# Patient Record
Sex: MALE | Race: BLACK OR AFRICAN AMERICAN | NOT HISPANIC OR LATINO | Employment: OTHER | URBAN - METROPOLITAN AREA
[De-identification: names, ages, dates, MRNs, and addresses within clinical notes are randomized per-mention and may not be internally consistent; named-entity substitution may affect disease eponyms.]

---

## 2019-07-24 ENCOUNTER — TELEPHONE (OUTPATIENT)
Dept: PHARMACY | Facility: CLINIC | Age: 54
End: 2019-07-24

## 2019-07-24 PROBLEM — B18.2 CHRONIC HEPATITIS C WITHOUT HEPATIC COMA: Status: ACTIVE | Noted: 2019-07-24

## 2019-07-24 NOTE — TELEPHONE ENCOUNTER
Informed Patient  that [facility] received prescription for Epclusa and benefits investigation is required.  [facility] will be back in touch once insurance determination is received.

## 2019-08-14 ENCOUNTER — TELEPHONE (OUTPATIENT)
Dept: PHARMACY | Facility: CLINIC | Age: 54
End: 2019-08-14

## 2019-08-23 NOTE — TELEPHONE ENCOUNTER
Initial Medication Consultation: AG Epclusa    Initial Epclusa consult completed on . Epclusa will be shipped on  to arrive at patient's home on  via [employer]. $0.00 copay. Patient will start Epclusa on . Address confirmed, CC on file. Confirmed 2 patient identifiers - name and . Therapy Appropriate.     Epclusa 400/100mg- Take one tablet by mouth daily x 12 weeks  Counseling was reviewed:   1. Patient MUST take Epclusa at the SAME time every day.   2. Patient MUST avoid acid reducers without consulting with myself or provider first. Antacids are to be spaced out at least 4 hours apart from Epclusa.    3. Potential Side effects include: headaches and fatigue.   Headache: Patient may treat with OTC remedies. If Tylenol is used, dose should not exceed 2000mg per day.    4. Medication list reviewed. No DDIs or allergies noted. Patient MUST contact myself or provider prior to starting any new OTC, herbal, or prescription drugs to avoid potential DDIs.    DDI: Medication list reviewed and potential DDIs addressed.    Discussed the importance of staying well hydrated while on therapy. Compliance stressed - patient to take missed doses as soon as remembered, but NOT to take 2 doses in one day. Patient will report questions or concerns to myself or practitioner. Patient verbalizes understanding. Patient plans to start Epclusa on .  Consultation included: indication; goals of treatment; administration; storage and handling; side effects; how to handle side effects; the importance of compliance; how to handle missed doses; the importance of laboratory monitoring; the importance of keeping all follow up appointments.  Patient understands to report any medication changes to [facility] and provider. All questions answered and addressed to patients satisfaction. I will f/u with her in 1 week from start, [facility] to contact patient in 3 weeks for refills.     AG Epclusa dispensed due to Medicaid formulary. Any reference to  Epclusa in above note is for AG Zac Avila, PharmD, [facility]  Clinical Pharmacist   [facility]  Phone: [phone]

## 2019-09-05 ENCOUNTER — TELEPHONE (OUTPATIENT)
Dept: PHARMACY | Facility: CLINIC | Age: 54
End: 2019-09-05

## 2019-09-05 NOTE — TELEPHONE ENCOUNTER
Epclusa 7 Day Touchbase attempted. Patient reports that he has not yet started taking Epclusa because he is out of town. He confirmed that it was delivered to his house and he will start taking it on Monday (9/9). Will follow-up.

## 2019-09-16 NOTE — TELEPHONE ENCOUNTER
Epclusa 7 Day Touchbase - Name/ confirmed.  Confirmed patient started medication as instructed on .  Patient confirms that they are taking Epclusa every day at 7:03 am.  Patient denies skipping or missing doses (uses an alarm).  Patient reports experiencing no side effects since beginning therapy. Patient reports no new medications, otc remedies, or allergies.  Patient counseled on importance of maintaining adherence and keeping lab appointments which were scheduled.  Advised to call [facility] and provider if any issues arise.  No questions or concerns at this time. Patient aware [facility] will reach out ~ 7 days before refill is due.

## 2019-09-30 ENCOUNTER — TELEPHONE (OUTPATIENT)
Dept: PHARMACY | Facility: CLINIC | Age: 54
End: 2019-09-30

## 2019-09-30 NOTE — TELEPHONE ENCOUNTER
Epclusa refill (2 of 3) confirmed and follow-up completed. We will ship Epclusa refill on  via [employer]ex to arrive on 10/1. $0 copay- 004. Confirmed 2 patient identifiers - name and .     Patient has 4 doses of Epclusa remaining and takes it at the same time daily.  Pt reports they are not having any side effects so far. No missed doses, no new medications, no new allergies or health conditions reported at this time. All questions answered and addressed to patients satisfaction. Pt verbalized understanding.

## 2019-10-28 ENCOUNTER — TELEPHONE (OUTPATIENT)
Dept: PHARMACY | Facility: CLINIC | Age: 54
End: 2019-10-28

## 2019-10-30 ENCOUNTER — TELEPHONE (OUTPATIENT)
Dept: PHARMACY | Facility: CLINIC | Age: 54
End: 2019-10-30

## 2019-10-30 NOTE — TELEPHONE ENCOUNTER
Refill readiness for Epclusa  confirmed with patient; name/ confirmed; no missed doses; no new medications; no side effects noted; address confirmed for 10/31 shipment and  delivery.  Patient states they have 6 doses remaining.     Patient reports abdominal pain with multiple episodes of N/V x 3 days. Patient also reports going to the ED several times over the past couple of days but there are no hospital encounters in medical records. Patient sounds severely distressed and claims he is in pain. Patient reports vomiting up his doses of Epclusa for approximately the last 2 days. He believes the pain is resulting from problems with his pancreas. Patient was told to re-visit ED if pain persists.     Irish Coates, Pharm D  [facility]   [phone]

## 2019-11-05 NOTE — TELEPHONE ENCOUNTER
Abdominal pain and N/V f/u.     Patient confirms that he is feeling much better and is no longer experiencing abdominal pain or N/V; patient states antiemetic meds are working in that he is no longer vomiting up food or Epclusa doses. Patient reports no missed doses of Epclusa and will continue Epclusa for last month of treatment. Patient also advised that GI specialist will be reaching out to set up an appointment in result of GI referral from Hep C clinic.     Irish Avila, PharmD, [facility]  Clinical Pharmacist   [facility]  Phone: [phone]

## 2020-01-16 PROBLEM — Z71.89 OTHER SPECIFIED COUNSELING: Status: ACTIVE | Noted: 2020-01-16

## 2020-01-16 PROBLEM — M15.0 PRIMARY GENERALIZED (OSTEO)ARTHRITIS: Status: ACTIVE | Noted: 2020-01-16

## 2020-01-16 PROBLEM — Z00.00 PREVENTATIVE HEALTH CARE: Status: ACTIVE | Noted: 2019-04-29

## 2020-01-16 PROBLEM — Z71.3 DIETARY COUNSELING: Status: ACTIVE | Noted: 2020-01-16

## 2020-01-24 PROBLEM — Z80.0 FAMILY HISTORY OF PANCREATIC CANCER: Status: ACTIVE | Noted: 2020-01-24

## 2020-01-24 PROBLEM — F12.91 HISTORY OF MARIJUANA USE: Status: ACTIVE | Noted: 2020-01-24

## 2020-02-10 PROBLEM — K29.60 EROSIVE GASTRITIS: Status: ACTIVE | Noted: 2020-02-10

## 2020-02-10 PROBLEM — K25.3 ACUTE GASTRIC ULCER WITHOUT HEMORRHAGE OR PERFORATION: Status: ACTIVE | Noted: 2020-02-10

## 2020-02-10 PROBLEM — K29.80 DUODENITIS: Status: ACTIVE | Noted: 2020-02-10

## 2020-02-10 PROBLEM — K44.9 HIATAL HERNIA: Status: ACTIVE | Noted: 2020-02-10

## 2020-03-16 PROBLEM — Z12.5 PROSTATE CANCER SCREENING: Status: ACTIVE | Noted: 2020-03-16

## 2020-04-20 PROBLEM — Z00.00 PREVENTATIVE HEALTH CARE: Status: RESOLVED | Noted: 2019-04-29 | Resolved: 2020-04-20

## 2020-07-16 PROBLEM — J45.909 ASTHMA: Status: ACTIVE | Noted: 2017-01-30

## 2020-07-16 PROBLEM — G43.909 MIGRAINE: Status: ACTIVE | Noted: 2017-01-30

## 2020-09-13 PROBLEM — H40.1131 PRIMARY OPEN ANGLE GLAUCOMA (POAG) OF BOTH EYES, MILD STAGE: Status: ACTIVE | Noted: 2020-09-13

## 2020-11-19 PROBLEM — E66.9 OBESITY (BMI 35.0-39.9 WITHOUT COMORBIDITY): Status: ACTIVE | Noted: 2017-01-30

## 2021-07-13 ENCOUNTER — PATIENT OUTREACH (OUTPATIENT)
Dept: ADMINISTRATIVE | Facility: HOSPITAL | Age: 56
End: 2021-07-13

## 2022-01-09 PROBLEM — H02.886 MEIBOMIAN GLAND DYSFUNCTION (MGD) OF BOTH EYES: Status: ACTIVE | Noted: 2022-01-09

## 2022-01-09 PROBLEM — H25.813 COMBINED FORM OF AGE-RELATED CATARACT, BOTH EYES: Status: ACTIVE | Noted: 2022-01-09

## 2022-01-09 PROBLEM — H52.7 REFRACTIVE ERRORS: Status: ACTIVE | Noted: 2022-01-09

## 2022-01-09 PROBLEM — H02.883 MEIBOMIAN GLAND DYSFUNCTION (MGD) OF BOTH EYES: Status: ACTIVE | Noted: 2022-01-09

## 2022-05-05 PROBLEM — K21.9 GERD (GASTROESOPHAGEAL REFLUX DISEASE): Status: ACTIVE | Noted: 2022-05-05

## 2022-05-05 PROBLEM — Z86.010 HISTORY OF ADENOMATOUS POLYP OF COLON: Status: ACTIVE | Noted: 2022-05-05

## 2022-05-05 PROBLEM — Z86.0101 HISTORY OF ADENOMATOUS POLYP OF COLON: Status: ACTIVE | Noted: 2022-05-05

## 2022-07-08 PROBLEM — A04.8 H. PYLORI INFECTION: Status: ACTIVE | Noted: 2022-07-08

## 2022-08-31 ENCOUNTER — PATIENT MESSAGE (OUTPATIENT)
Dept: ADMINISTRATIVE | Facility: HOSPITAL | Age: 57
End: 2022-08-31

## 2022-09-02 ENCOUNTER — PATIENT MESSAGE (OUTPATIENT)
Dept: ADMINISTRATIVE | Facility: HOSPITAL | Age: 57
End: 2022-09-02

## 2022-10-24 ENCOUNTER — PATIENT OUTREACH (OUTPATIENT)
Dept: ADMINISTRATIVE | Facility: HOSPITAL | Age: 57
End: 2022-10-24

## 2022-10-24 ENCOUNTER — PATIENT MESSAGE (OUTPATIENT)
Dept: ADMINISTRATIVE | Facility: HOSPITAL | Age: 57
End: 2022-10-24

## 2025-01-30 PROBLEM — R11.2 NAUSEA VOMITING AND DIARRHEA: Status: ACTIVE | Noted: 2025-01-30

## 2025-01-30 PROBLEM — J11.1 INFLUENZA: Status: ACTIVE | Noted: 2025-01-30

## 2025-01-30 PROBLEM — E66.9 OBESITY: Status: ACTIVE | Noted: 2025-01-30

## 2025-01-30 PROBLEM — R07.9 CHEST PAIN: Status: ACTIVE | Noted: 2025-01-30

## 2025-01-30 PROBLEM — M25.552 BILATERAL HIP PAIN: Status: ACTIVE | Noted: 2025-01-30

## 2025-01-30 PROBLEM — M25.551 BILATERAL HIP PAIN: Status: ACTIVE | Noted: 2025-01-30

## 2025-01-30 PROBLEM — R19.7 NAUSEA VOMITING AND DIARRHEA: Status: ACTIVE | Noted: 2025-01-30

## 2025-01-30 PROBLEM — R05.9 COUGH: Status: ACTIVE | Noted: 2025-01-30

## 2025-01-30 PROBLEM — R91.8: Status: ACTIVE | Noted: 2025-01-30

## 2025-01-31 PROBLEM — R91.8: Status: RESOLVED | Noted: 2025-01-30 | Resolved: 2025-01-31

## 2025-02-01 PROBLEM — R91.1 LUNG NODULE SEEN ON IMAGING STUDY: Status: ACTIVE | Noted: 2025-02-01

## 2025-02-01 PROBLEM — R91.8: Status: ACTIVE | Noted: 2025-02-01

## 2025-02-03 PROBLEM — R07.9 CHEST PAIN: Status: RESOLVED | Noted: 2025-01-30 | Resolved: 2025-02-03

## 2025-02-03 PROBLEM — R91.8: Status: RESOLVED | Noted: 2025-02-01 | Resolved: 2025-02-03

## 2025-02-26 ENCOUNTER — PATIENT OUTREACH (OUTPATIENT)
Dept: ADMINISTRATIVE | Facility: HOSPITAL | Age: 60
End: 2025-02-26

## 2025-03-05 ENCOUNTER — PATIENT OUTREACH (OUTPATIENT)
Dept: ADMINISTRATIVE | Facility: HOSPITAL | Age: 60
End: 2025-03-05

## 2025-03-05 DIAGNOSIS — E11.9 DIABETES MELLITUS TYPE 2 WITHOUT RETINOPATHY: Primary | ICD-10-CM

## 2025-03-06 ENCOUNTER — PATIENT OUTREACH (OUTPATIENT)
Dept: ADMINISTRATIVE | Facility: HOSPITAL | Age: 60
End: 2025-03-06

## 2025-03-20 ENCOUNTER — PATIENT OUTREACH (OUTPATIENT)
Dept: ADMINISTRATIVE | Facility: HOSPITAL | Age: 60
End: 2025-03-20